# Patient Record
Sex: MALE | Race: OTHER | HISPANIC OR LATINO
[De-identification: names, ages, dates, MRNs, and addresses within clinical notes are randomized per-mention and may not be internally consistent; named-entity substitution may affect disease eponyms.]

---

## 2022-09-14 ENCOUNTER — APPOINTMENT (OUTPATIENT)
Dept: ORTHOPEDIC SURGERY | Facility: CLINIC | Age: 77
End: 2022-09-14

## 2022-09-14 VITALS — BODY MASS INDEX: 29.02 KG/M2 | WEIGHT: 170 LBS | HEIGHT: 64 IN

## 2022-09-14 DIAGNOSIS — I10 ESSENTIAL (PRIMARY) HYPERTENSION: ICD-10-CM

## 2022-09-14 DIAGNOSIS — Z78.9 OTHER SPECIFIED HEALTH STATUS: ICD-10-CM

## 2022-09-14 DIAGNOSIS — E11.9 TYPE 2 DIABETES MELLITUS W/OUT COMPLICATIONS: ICD-10-CM

## 2022-09-14 PROBLEM — Z00.00 ENCOUNTER FOR PREVENTIVE HEALTH EXAMINATION: Status: ACTIVE | Noted: 2022-09-14

## 2022-09-14 PROCEDURE — 29075 APPL CST ELBW FNGR SHORT ARM: CPT | Mod: LT

## 2022-09-14 PROCEDURE — 99213 OFFICE O/P EST LOW 20 MIN: CPT | Mod: 25

## 2022-09-14 PROCEDURE — 73110 X-RAY EXAM OF WRIST: CPT | Mod: LT

## 2022-09-15 PROBLEM — I10 HYPERTENSION: Status: RESOLVED | Noted: 2022-09-15 | Resolved: 2022-09-15

## 2022-09-15 PROBLEM — Z78.9 NON-SMOKER: Status: ACTIVE | Noted: 2022-09-15

## 2022-09-15 PROBLEM — E11.9 DIABETES: Status: RESOLVED | Noted: 2022-09-15 | Resolved: 2022-09-15

## 2022-09-15 PROBLEM — Z78.9 NO PERTINENT FAMILY HISTORY: Status: ACTIVE | Noted: 2022-09-15

## 2022-09-15 NOTE — PHYSICAL EXAM
[Left] : left hand [] : tenderness at fracture site [Distal Radius] : distal radius [Ulna Styloid] : ulna styloid [FreeTextEntry9] : Range of motion, strength and specialty testing deferred

## 2022-09-15 NOTE — DATA REVIEWED
[Left] : left [Wrist] : wrist [I independently reviewed and interpreted images and report] : I independently reviewed and interpreted images and report [FreeTextEntry1] :  x-rays from urgent Care showed a ulna styloid fracture as well as a minimally displaced intra-articular distal radius fracture

## 2022-09-15 NOTE — ASSESSMENT
[FreeTextEntry1] :  After discussing Radiology results a Cast of the affected limb was recommended. Short Arm Cast Applied. Patient tolerated Procedure well. Cast Care reviewed in detail with patient. Discussed that if the cast started to feel tight they should elevate the affected limb. If the cast becomes loose, and begins to come off patient was instructed to call the clinic ASAP and we would bring them in for re-evaluation and possible recasting. \par  patient will continue with OTC Tylenol as needed for pain\par  will follow up in the hand department in 3 weeks\par  lengthy conversation was had with patient and his daughter regarding the fact that he is 2 and half weeks out from his initial injury and that the intra-articular nature of the fracture as well as the slight displacement can result in some traumatic arthritis, decreased range of motion that might be chronic as well as some persistent wrist pain with utilization.  He expressed understanding.\par \par This visit was seen under the direct supervision of Dr. Timo Dye

## 2022-10-07 ENCOUNTER — APPOINTMENT (OUTPATIENT)
Dept: ORTHOPEDIC SURGERY | Facility: CLINIC | Age: 77
End: 2022-10-07

## 2022-10-07 PROCEDURE — 99214 OFFICE O/P EST MOD 30 MIN: CPT

## 2022-10-07 PROCEDURE — 73110 X-RAY EXAM OF WRIST: CPT | Mod: LT

## 2022-10-10 NOTE — ASSESSMENT
[FreeTextEntry1] : Patient comes in after having left distal radius fracture.  The cast was removed today.  He says he is doing well.  His injury occurred about 6 weeks ago.  He has been in the cast for about 3 weeks.  He does not complaints today.\par \par   Left wrist; skin intact after cast removal, minimally tender will patient her distal radius, decreased range of motion of wrist secondary stiffness, full range of motion of fingers, neurovascularly intact\par \par  x-rays show distal radius fracture with dorsal angulation\par \par  status post left distal radius fracture.  Patient is doing well.  He was given a wrist splint.  He is going work on range of motion.  He will follow up in a month with Katia for re-evaluation of motion and assessment of the fracture.

## 2022-11-08 ENCOUNTER — APPOINTMENT (OUTPATIENT)
Dept: ORTHOPEDIC SURGERY | Facility: CLINIC | Age: 77
End: 2022-11-08

## 2022-11-08 ENCOUNTER — RESULT CHARGE (OUTPATIENT)
Age: 77
End: 2022-11-08

## 2022-11-08 VITALS — BODY MASS INDEX: 29.02 KG/M2 | WEIGHT: 170 LBS | HEIGHT: 64 IN

## 2022-11-08 PROCEDURE — 73110 X-RAY EXAM OF WRIST: CPT | Mod: LT

## 2022-11-08 PROCEDURE — 99213 OFFICE O/P EST LOW 20 MIN: CPT

## 2022-11-08 NOTE — PHYSICAL EXAM
[de-identified] :   Physical exam of his left wrist:  Negative swelling or ecchymosis.  Mild tenderness over the distal radius or distal ulna.  Negative anatomical snuffbox tenderness.  Decreased range of motion of the wrist and hand.  He cannot make a full fist.  Sensory and motor are intact.

## 2022-11-08 NOTE — DISCUSSION/SUMMARY
[de-identified] :   He is about 6 weeks status post the injury.\par He has limited range of motion and continued pain.\par He understands he may get random residual pain for 6 months to year.\par I wrote a prescription for occupational/physical therapy for stretching, strengthening, range of motion, and different modalities to help with the pain and inflammation.\par He will follow up in 6 weeks for range-of-motion check.  No x-rays needed at that appointment.  All questions were answered today.

## 2022-11-08 NOTE — HISTORY OF PRESENT ILLNESS
[de-identified] : \par \par Patient is a 77-year-old male here for repeat evaluation of his left wrist.  He is status post distal radius fracture.  He is still having pain and limited range of motion.

## 2022-11-08 NOTE — DATA REVIEWED
[FreeTextEntry1] :   X-rays repeated in the office today of his left wrist show a healed distal radius fracture.

## 2022-12-20 ENCOUNTER — APPOINTMENT (OUTPATIENT)
Dept: ORTHOPEDIC SURGERY | Facility: CLINIC | Age: 77
End: 2022-12-20

## 2022-12-20 DIAGNOSIS — S52.572A OTHER INTRAARTICULAR FRACTURE OF LOWER END OF LEFT RADIUS, INITIAL ENCOUNTER FOR CLOSED FRACTURE: ICD-10-CM

## 2022-12-20 PROCEDURE — 99213 OFFICE O/P EST LOW 20 MIN: CPT

## 2022-12-20 NOTE — ASSESSMENT
[FreeTextEntry1] : Patient comes in for follow-up of distal radius fracture.  He is doing well.  Been in therapy.  He does not complaints today.  He is able to do everything he needs to.\par \par   Left upper extremity:  Nontender but patient wear along the distal radius, able to make a fist, flexion 30° extension 20°, supination 65, pronation 65, neurovascularly intact\par \par  status post left distal radius fracture.  Patient is doing well.  He has no complaints.  He is able to do everything he needs to.  He will follow up as needed.

## 2023-10-10 ENCOUNTER — APPOINTMENT (OUTPATIENT)
Dept: NEUROLOGY | Facility: CLINIC | Age: 78
End: 2023-10-10
Payer: MEDICARE

## 2023-10-10 VITALS — SYSTOLIC BLOOD PRESSURE: 128 MMHG | DIASTOLIC BLOOD PRESSURE: 66 MMHG | HEART RATE: 70 BPM

## 2023-10-10 VITALS — BODY MASS INDEX: 26.8 KG/M2 | HEIGHT: 64 IN | WEIGHT: 157 LBS

## 2023-10-10 DIAGNOSIS — F03.90 UNSPECIFIED DEMENTIA W/OUT BEHAVIORAL DISTURBANCE: ICD-10-CM

## 2023-10-10 DIAGNOSIS — R26.81 UNSTEADINESS ON FEET: ICD-10-CM

## 2023-10-10 PROCEDURE — 99204 OFFICE O/P NEW MOD 45 MIN: CPT

## 2023-10-11 ENCOUNTER — NON-APPOINTMENT (OUTPATIENT)
Age: 78
End: 2023-10-11

## 2023-11-13 ENCOUNTER — APPOINTMENT (OUTPATIENT)
Dept: NEUROLOGY | Facility: CLINIC | Age: 78
End: 2023-11-13

## 2023-12-08 ENCOUNTER — APPOINTMENT (OUTPATIENT)
Dept: NEUROLOGY | Facility: CLINIC | Age: 78
End: 2023-12-08
Payer: MEDICARE

## 2023-12-08 PROCEDURE — 95816 EEG AWAKE AND DROWSY: CPT

## 2023-12-14 ENCOUNTER — APPOINTMENT (OUTPATIENT)
Dept: SLEEP CENTER | Facility: HOSPITAL | Age: 78
End: 2023-12-14
Payer: MEDICARE

## 2023-12-14 ENCOUNTER — OUTPATIENT (OUTPATIENT)
Dept: OUTPATIENT SERVICES | Facility: HOSPITAL | Age: 78
LOS: 1 days | Discharge: HOME | End: 2023-12-14
Payer: MEDICARE

## 2023-12-14 PROCEDURE — 95810 POLYSOM 6/> YRS 4/> PARAM: CPT | Mod: 26

## 2023-12-14 PROCEDURE — 95810 POLYSOM 6/> YRS 4/> PARAM: CPT

## 2023-12-15 DIAGNOSIS — G47.33 OBSTRUCTIVE SLEEP APNEA (ADULT) (PEDIATRIC): ICD-10-CM

## 2023-12-16 DIAGNOSIS — G47.33 OBSTRUCTIVE SLEEP APNEA (ADULT) (PEDIATRIC): ICD-10-CM

## 2023-12-22 ENCOUNTER — APPOINTMENT (OUTPATIENT)
Dept: NEUROLOGY | Facility: CLINIC | Age: 78
End: 2023-12-22
Payer: MEDICARE

## 2023-12-22 VITALS
DIASTOLIC BLOOD PRESSURE: 76 MMHG | HEART RATE: 88 BPM | WEIGHT: 315 LBS | BODY MASS INDEX: 53.78 KG/M2 | SYSTOLIC BLOOD PRESSURE: 133 MMHG | HEIGHT: 64 IN

## 2023-12-22 DIAGNOSIS — F01.50 VASCULAR DEMENTIA W/OUT BEHAVIORAL DISTURBANCE: ICD-10-CM

## 2023-12-22 DIAGNOSIS — R41.89 OTHER SYMPTOMS AND SIGNS INVOLVING COGNITIVE FUNCTIONS AND AWARENESS: ICD-10-CM

## 2023-12-22 PROCEDURE — 99214 OFFICE O/P EST MOD 30 MIN: CPT

## 2023-12-22 RX ORDER — DONEPEZIL HYDROCHLORIDE 10 MG/1
10 TABLET ORAL
Qty: 30 | Refills: 2 | Status: ACTIVE | COMMUNITY
Start: 2023-12-22 | End: 1900-01-01

## 2023-12-22 NOTE — PHYSICAL EXAM

## 2023-12-22 NOTE — HISTORY OF PRESENT ILLNESS
[FreeTextEntry1] : ORIGINAL PRESENTATION:  It is a pleasure to see MR. GURROLA In the office today. He is a 78-Year-old Kinyarw[place] speaking man who presents to the office today for the evaluation of Cognitive Impairment. Accompanied by his daughter today. He comes in with difficulty remembering certain words, sentences he was previously told in the past. Patient is under care of a psychiatrist for anxiety depression. History off anxiety attacks for the past 30 years and reports no strokes or seizures. He has no recent imaging done for this condition. He was recently sent for blood work by his psychiatrist. Diabetes is well controlled but is unsure of his last A1C number. Hypertension is also monitored with the help of medication. Back when he was living in [place], he denies having these symptoms. He suffered from falls while he resided in [place]. Lastly, he makes sure he gets enough physical activity in his daily routine.  When he is asked about sleep, he was never checked for sleep apnea. He snores during sleep but denies wheezing and gasping. I discussed with patient the benefits of treating underlying sleep apnea to better treat hypertension and dementia. According to his neuro exam, he does show narrow airway.  TODAY:  I had the pleasure of seeing Mr. Gurrola accompanied by his daughter.  His previous history and physical findings have been reviewed.  He is under our care for possible dementia which he is receiving continuing active treatment for.  He underwent MRI brain and EEG for further evaluation and presents today to review the results.  EEG was normal.  MRI brain showed no acute intracranial pathology, especially no evidence of acute ischemia, hemorrhage, mass lesion or hydrocephalus.  Probable mild chronic microvascular ischemia of the cerebral white matter, and global cerebral atrophy.  Moderate mucosal thickening and secretions of the right maxillary sinus.  Patient's daughter states he continues to have issues with his memory stating he forgets where he puts things and names of people and places.  He has issues with word finding as well and we will discuss next steps.

## 2023-12-22 NOTE — ASSESSMENT
[FreeTextEntry1] : 78 year old male with probable vascular dementia.  I will trial Aricept 10 mg 1/2 tab po qd x 14 days then increase to once daily thereafter.  I will have him be evaluated by a neuropsychologist and f/u in 3 months for reevaluation.  If there is any issue they will contact the office.  Bharti Massey MS, PAYAMILKA Garcia DO, Supervising Physician

## 2024-01-30 ENCOUNTER — APPOINTMENT (OUTPATIENT)
Dept: NEUROLOGY | Facility: CLINIC | Age: 79
End: 2024-01-30

## 2024-02-23 ENCOUNTER — EMERGENCY (EMERGENCY)
Facility: HOSPITAL | Age: 79
LOS: 0 days | Discharge: HOME | End: 2024-02-23
Attending: EMERGENCY MEDICINE
Payer: MEDICARE

## 2024-02-23 VITALS
HEART RATE: 97 BPM | OXYGEN SATURATION: 99 % | SYSTOLIC BLOOD PRESSURE: 131 MMHG | RESPIRATION RATE: 17 BRPM | WEIGHT: 162.04 LBS | DIASTOLIC BLOOD PRESSURE: 85 MMHG | TEMPERATURE: 98 F

## 2024-02-23 DIAGNOSIS — G89.29 OTHER CHRONIC PAIN: ICD-10-CM

## 2024-02-23 DIAGNOSIS — E78.5 HYPERLIPIDEMIA, UNSPECIFIED: ICD-10-CM

## 2024-02-23 DIAGNOSIS — I10 ESSENTIAL (PRIMARY) HYPERTENSION: ICD-10-CM

## 2024-02-23 DIAGNOSIS — M54.50 LOW BACK PAIN, UNSPECIFIED: ICD-10-CM

## 2024-02-23 DIAGNOSIS — E11.9 TYPE 2 DIABETES MELLITUS WITHOUT COMPLICATIONS: ICD-10-CM

## 2024-02-23 PROCEDURE — 99283 EMERGENCY DEPT VISIT LOW MDM: CPT | Mod: 25

## 2024-02-23 PROCEDURE — 99284 EMERGENCY DEPT VISIT MOD MDM: CPT

## 2024-02-23 PROCEDURE — 96372 THER/PROPH/DIAG INJ SC/IM: CPT

## 2024-02-23 RX ORDER — KETOROLAC TROMETHAMINE 30 MG/ML
30 SYRINGE (ML) INJECTION ONCE
Refills: 0 | Status: DISCONTINUED | OUTPATIENT
Start: 2024-02-23 | End: 2024-02-23

## 2024-02-23 RX ORDER — ACETAMINOPHEN 500 MG
975 TABLET ORAL ONCE
Refills: 0 | Status: COMPLETED | OUTPATIENT
Start: 2024-02-23 | End: 2024-02-23

## 2024-02-23 RX ORDER — LIDOCAINE 4 G/100G
1 CREAM TOPICAL ONCE
Refills: 0 | Status: COMPLETED | OUTPATIENT
Start: 2024-02-23 | End: 2024-02-23

## 2024-02-23 RX ADMIN — Medication 30 MILLIGRAM(S): at 13:05

## 2024-02-23 RX ADMIN — LIDOCAINE 1 PATCH: 4 CREAM TOPICAL at 13:05

## 2024-02-23 RX ADMIN — Medication 975 MILLIGRAM(S): at 13:05

## 2024-02-23 NOTE — ED PROVIDER NOTE - NSPTACCESSSVCSAPPTDETAILS_ED_ALL_ED_FT
Please evaluate and treat for low back pain Please evaluate and treat for low back pain      PAIN MANAGEMENT - sciatica/herniated disc

## 2024-02-23 NOTE — ED PROVIDER NOTE - PHYSICAL EXAMINATION
CONST: Well appearing in NAD  EYES: PERRL, EOMI, Sclera and conjunctiva clear.   ENT: Oropharynx normal appearing, no erythema or exudates. Uvula midline.  CARD: Normal S1 S2; Normal rate and rhythm  RESP: Equal BS B/L, No wheezes, rhonchi or rales. No distress  GI: Soft, non-tender, non-distended.  MS: TTP over the L sided lumbosacral area. No midline TTP.   SKIN: Warm, dry, no acute rashes.   NEURO: A&Ox3, No focal deficits. Strength 5/5 with no sensory deficits. Steady gait

## 2024-02-23 NOTE — ED PROVIDER NOTE - OBJECTIVE STATEMENT
78-year-old male with past medical history of hypertension, hyperlipidemia, diabetes presents to the ED complaining of back pain.  Patient with left-sided back pain over the last several days worsening acutely this morning radiating down his left leg described as electric sensation.  Pain is worse with movement.  Took Tylenol with minimal improvement.  Denies any fever, chills, urinary or fecal incontinence, urinary retention, saddle anesthesia, history of cancer falls or trauma.

## 2024-02-23 NOTE — ED PROVIDER NOTE - ATTENDING APP SHARED VISIT CONTRIBUTION OF CARE
77 yo m with pmh of iddm (last hgbA1c 8.7), htn, hld, herniated discs, chronic L back pain, presents with worsening L back/buttock pain radiating down the L leg.  pt admits has had epidural shots in the past, last one was 1 yr ago in [place].  pt denies any trauma.  no back pain red flags.  exam: nad, ncat, perrl, eomi, mmm, rrr, ctab, abd soft, nt, nd aox3, no calf tenderness, no pitting edema, ttp L upper buttock/si notch, no midline lumbar paravetebral muscle imp: pt with h/o L sciatica 2/2 herniated discs, here with acute on chronic back pain.  symptomatic tx.  decided against steroids due to his dm.  pt is encouraged to f/u with pain mgt outpt.

## 2024-02-23 NOTE — ED PROVIDER NOTE - PATIENT PORTAL LINK FT
You can access the FollowMyHealth Patient Portal offered by [facility] by registering at the following website: [email or web address]. By joining [facility]’s FollowMyHealth portal, you will also be able to view your health information using other applications (apps) compatible with our system.

## 2024-02-23 NOTE — ED PROVIDER NOTE - NSFOLLOWUPINSTRUCTIONS_ED_ALL_ED_FT
Our Emergency Department Referral Coordinators will be reaching out to you in the next 24-48 hours from 9:00am to 5:00pm with a follow up appointment. Please expect a phone call from the hospital in that time frame. If you do not receive a call or if you have any questions or concerns, you can reach them at   [phone]      Back Pain    Back pain is very common in adults. The cause of back pain is rarely dangerous and the pain often gets better over time. The cause of your back pain may not be known and may include strain of muscles or ligaments, degeneration of the spinal disks, or arthritis. Occasionally the pain may radiate down your leg(s). Over-the-counter medicines to reduce pain and inflammation are often the most helpful. Stretching and remaining active frequently helps the healing process.     SEEK IMMEDIATE MEDICAL CARE IF YOU HAVE THE FOLLOWING SYMPTOMS: bowel or bladder control problems, unusual weakness or numbness in your arms or legs, nausea or vomiting, abdominal pain, fever, dizziness/lightheadedness.

## 2024-02-23 NOTE — ED PROVIDER NOTE - CLINICAL SUMMARY MEDICAL DECISION MAKING FREE TEXT BOX
pt with h/o L sciatica 2/2 herniated discs, here with acute on chronic back pain.  symptomatic tx.  decided against steroids due to his dm.  pt is encouraged to f/u with pain mgt outpt.  Pt was given strict return to ED precautions and pt indicated that he/she understood.

## 2024-02-29 ENCOUNTER — APPOINTMENT (OUTPATIENT)
Dept: NEUROLOGY | Facility: CLINIC | Age: 79
End: 2024-02-29
Payer: MEDICARE

## 2024-02-29 DIAGNOSIS — G47.30 SLEEP APNEA, UNSPECIFIED: ICD-10-CM

## 2024-02-29 PROCEDURE — 99213 OFFICE O/P EST LOW 20 MIN: CPT

## 2024-02-29 NOTE — HISTORY OF PRESENT ILLNESS
[FreeTextEntry1] : Mr. LARISSA PONCE returns to the office for follow-up and his prior history and physical have been reviewed and he reports no change since last visit.  He is Cambodian-speaking only, but he is here with his daughter who acts as a .  He initially came for the evaluation of cognitive impairment, and was sent for sleep study suspecting obstructive sleep apnea.  He recently did a sleep study overnight and the lab which he said was typical of his sleep.  The result did not show any evidence of obstructive sleep apnea, the AHI was only 3.5.  His RDI however was 11.8.  Clinically even though patient denies daytime sleepiness, but the daughter reports that she sees him dozing off during the day rather frequently.  Patient does not want to consider CPAP therapy at this time

## 2024-02-29 NOTE — ASSESSMENT
[FreeTextEntry1] : Upper airway resistance syndrome -Sleep study result explained in detail, questions answered - Since patient is not interested in CPAP therapy, I recommend oral appliance  Total clinician time spent  is  21 minutes including preparing to see the patient, obtaining and/or reviewing and confirming history, performing a medically necessary and appropriate examination, counseling and educating the patient and/or family, documenting clinical information in the HER and communicating and/or referring to other healthcare professionals.

## 2024-02-29 NOTE — PHYSICAL EXAM
[Person] : oriented to person [Place] : oriented to place [Concentration Intact] : normal concentrating ability [Time] : oriented to time [Visual Intact] : visual attention was ~T not ~L decreased [Naming Objects] : no difficulty naming common objects [Writing A Sentence] : no difficulty writing a sentence [Repeating Phrases] : no difficulty repeating a phrase [Fluency] : fluency intact [Comprehension] : comprehension intact [Past History] : adequate knowledge of personal past history [Reading] : reading intact [Cranial Nerves Trigeminal (V)] : facial sensation intact symmetrically [Cranial Nerves Oculomotor (III)] : extraocular motion intact [Cranial Nerves Optic (II)] : visual acuity intact bilaterally,  visual fields full to confrontation, pupils equal round and reactive to light [Cranial Nerves Vestibulocochlear (VIII)] : hearing was intact bilaterally [Cranial Nerves Facial (VII)] : face symmetrical [Cranial Nerves Hypoglossal (XII)] : there was no tongue deviation with protrusion [Cranial Nerves Accessory (XI - Cranial And Spinal)] : head turning and shoulder shrug symmetric [Cranial Nerves Glossopharyngeal (IX)] : tongue and palate midline [Motor Strength] : muscle strength was normal in all four extremities [Motor Tone] : muscle tone was normal in all four extremities [No Muscle Atrophy] : normal bulk in all four extremities [Sensation Tactile Decrease] : light touch was intact [Balance] : balance was intact [Abnormal Walk] : normal gait [2+] : Ankle jerk left 2+ [Past-pointing] : there was no past-pointing [Plantar Reflex Right Only] : normal on the right [Tremor] : no tremor present [Plantar Reflex Left Only] : normal on the left [FreeTextEntry4] : trouble spelling "world" in [place]onian bacwards, 0/3 recall, trouble drawing complex figures and clock with correct time

## 2024-03-01 ENCOUNTER — APPOINTMENT (OUTPATIENT)
Dept: NEUROLOGY | Facility: CLINIC | Age: 79
End: 2024-03-01

## 2024-10-08 ENCOUNTER — APPOINTMENT (OUTPATIENT)
Dept: ORTHOPEDIC SURGERY | Facility: CLINIC | Age: 79
End: 2024-10-08
Payer: MEDICARE

## 2024-10-08 DIAGNOSIS — S82.831A OTHER FRACTURE OF UPPER AND LOWER END OF RIGHT FIBULA, INITIAL ENCOUNTER FOR CLOSED FRACTURE: ICD-10-CM

## 2024-10-08 PROCEDURE — 99214 OFFICE O/P EST MOD 30 MIN: CPT

## 2024-10-11 NOTE — HISTORY OF PRESENT ILLNESS
[de-identified] : Patient is a 76-year-old male  presenting for distal radius/ distal ulna styloid fracture.  Patient slipped and fell 2 and half weeks ago and since that time has been having a lot of pain.  Due to persistent pain and lack of improvement he went to an urgent care to be seen.  He had x-rays taken 2 days ago showing an intra-articular comminuted fracture of the distal radius as well as a healing fracture of the ulna styloid.  He was placed in a cock-up wrist brace and referred to our office for initial orthopedic evaluation. normal...

## 2024-10-29 NOTE — ED ADULT TRIAGE NOTE - WEIGHT IN LBS
Nurse Triage SBAR    Situation: Chest pain    Background: Patient calling. The chest pain started about 30 minutes ago. Family history of heart disease.    Assessment: Shooting pain in chest. The pain is mostly when he is breathing. The pain is getting worse. Pain is 7/10. When he stands he has sob.     Protocol Recommended Disposition: Call 911    Recommendation: According to the protocol, Patient should Call 911. Advised Patient that the patient needs to Call 911. Care advice given. Patient verbalizes understanding and agrees with plan of care.     Alina Grimes RN Nursing Advisor 10/28/2024 8:09 PM     Reason for Disposition   Chest pain lasting longer than 5 minutes and ANY of the following:    history of heart disease  (i.e., heart attack, bypass surgery, angina, angioplasty, CHF; not just a heart murmur)    described as crushing, pressure-like, or heavy    age > 50    age > 30 AND at least one cardiac risk factor (i.e., hypertension, diabetes, obesity, smoker or strong family history of heart disease)    not relieved with nitroglycerin    Additional Information   Negative: SEVERE difficulty breathing (e.g., struggling for each breath, speaks in single words)   Negative: Difficult to awaken or acting confused (e.g., disoriented, slurred speech)   Negative: Shock suspected (e.g., cold/pale/clammy skin, too weak to stand, low BP, rapid pulse)   Negative: Passed out (i.e., lost consciousness, collapsed and was not responding)    Protocols used: Chest Pain-A-     [Healing] : healing [Size%: ______] : Size: [unfilled]% [Infected?] : Infected: No [3] : 3 out of 10 [Abnormal] : abnormal [Large] : medium [] : no [de-identified] : The left lower leg venous stasis ulcers wound is healing with BTM. and measures [id number].  The silastic part of BTM removed with ood granulating wound .  A wound culture was obtained.  The patient was instructed to clean  the wound with soap and water. Continue local wound care.  Follow up 2 - 4  weeks.  [TWNoteComboBox1] : adaptic 162

## 2024-10-30 ENCOUNTER — APPOINTMENT (OUTPATIENT)
Dept: ORTHOPEDIC SURGERY | Facility: CLINIC | Age: 79
End: 2024-10-30
Payer: MEDICARE

## 2024-10-30 DIAGNOSIS — S82.831A OTHER FRACTURE OF UPPER AND LOWER END OF RIGHT FIBULA, INITIAL ENCOUNTER FOR CLOSED FRACTURE: ICD-10-CM

## 2024-10-30 PROCEDURE — 27786 TREATMENT OF ANKLE FRACTURE: CPT | Mod: RT

## 2024-10-30 PROCEDURE — 73610 X-RAY EXAM OF ANKLE: CPT | Mod: RT

## 2024-10-30 PROCEDURE — 99204 OFFICE O/P NEW MOD 45 MIN: CPT | Mod: 57

## 2024-10-30 PROCEDURE — L1902: CPT | Mod: RT

## 2024-12-06 ENCOUNTER — APPOINTMENT (OUTPATIENT)
Dept: ORTHOPEDIC SURGERY | Facility: CLINIC | Age: 79
End: 2024-12-06

## 2024-12-06 DIAGNOSIS — S82.831A OTHER FRACTURE OF UPPER AND LOWER END OF RIGHT FIBULA, INITIAL ENCOUNTER FOR CLOSED FRACTURE: ICD-10-CM
